# Patient Record
Sex: FEMALE | Race: WHITE | NOT HISPANIC OR LATINO | Employment: FULL TIME | URBAN - METROPOLITAN AREA
[De-identification: names, ages, dates, MRNs, and addresses within clinical notes are randomized per-mention and may not be internally consistent; named-entity substitution may affect disease eponyms.]

---

## 2017-01-26 DIAGNOSIS — Z13.0 SCREENING FOR DISORDER OF BLOOD AND BLOOD-FORMING ORGANS: ICD-10-CM

## 2017-01-26 LAB
ERYTHROCYTE [DISTWIDTH] IN BLOOD BY AUTOMATED COUNT: 12.9 % (ref 10–15)
HCT VFR BLD AUTO: 43.5 % (ref 35–47)
HGB BLD-MCNC: 14.6 G/DL (ref 11.7–15.7)
MCH RBC QN AUTO: 31.3 PG (ref 26.5–33)
MCHC RBC AUTO-ENTMCNC: 33.6 G/DL (ref 31.5–36.5)
MCV RBC AUTO: 93 FL (ref 78–100)
PLATELET # BLD AUTO: 233 10E9/L (ref 150–450)
RBC # BLD AUTO: 4.67 10E12/L (ref 3.8–5.2)
WBC # BLD AUTO: 8.2 10E9/L (ref 4–11)

## 2017-01-26 PROCEDURE — 36415 COLL VENOUS BLD VENIPUNCTURE: CPT | Performed by: OBSTETRICS & GYNECOLOGY

## 2017-01-26 PROCEDURE — 85027 COMPLETE CBC AUTOMATED: CPT | Performed by: OBSTETRICS & GYNECOLOGY

## 2017-02-02 ENCOUNTER — MYC MEDICAL ADVICE (OUTPATIENT)
Dept: OBGYN | Facility: CLINIC | Age: 62
End: 2017-02-02

## 2017-02-02 ENCOUNTER — MYC REFILL (OUTPATIENT)
Dept: OBGYN | Facility: CLINIC | Age: 62
End: 2017-02-02

## 2017-02-02 DIAGNOSIS — L50.9 URTICARIA: ICD-10-CM

## 2017-02-02 RX ORDER — PREDNISONE 5 MG/1
5 TABLET ORAL DAILY
Qty: 5 TABLET | Refills: 0 | Status: SHIPPED | OUTPATIENT
Start: 2017-02-02 | End: 2017-02-09 | Stop reason: ALTCHOICE

## 2017-02-02 RX ORDER — PREDNISONE 5 MG/1
5 TABLET ORAL DAILY
Qty: 5 TABLET | Refills: 0 | Status: CANCELLED | OUTPATIENT
Start: 2017-02-02

## 2017-02-02 NOTE — TELEPHONE ENCOUNTER
predniSONE (DELTASONE) 5 MG tablet  Last Written Prescription Date:  10/5/15  Last Fill Quantity: 5,   # refills: 0  Last Office Visit with [facility] primary care provider:  11/25/16  Future Office visit: none    Routing refill request to provider for review/approval because: OK to send refill    2/2/17 My Chart: I have had hives on my legs, back and arms for over a month.   Benedryl and Zyrtec don't seem to help so I've started taking the prednisone again.  I've changed my detergent, etc but still uncontrollable itching.    The only new thing introduced full time since my appointment is the hydrochlorothiazide.   I read on the side effects note it could cause hives and itching.     Is there a different blood pressure medicine I can try?   The itching is severe.   Dr. Gordon: Stop the HCTZ and see if improves. RTC in 2 weeks for BP check and decision about a different med. Can see CE.

## 2017-02-02 NOTE — TELEPHONE ENCOUNTER
Routing pt's [employer] message below to on-call Dr. Gordon (Dr. Akins is out of office) to review and advise.

## 2017-02-02 NOTE — TELEPHONE ENCOUNTER
Message from MyChart:  Original authorizing provider: Kassy Akins MD    Angelica PERLA Thuy would like a refill of the following medications:  predniSONE (DELTASONE) 5 MG tablet [Kassy Akins MD]    Preferred pharmacy: [facility] [address] [place] [address] [address]    Comment:

## 2017-02-02 NOTE — TELEPHONE ENCOUNTER
Stop the HCTZ and see if improves. RTC in 2 weeks for BP check and decision about a different med. Can see CE

## 2017-02-03 NOTE — TELEPHONE ENCOUNTER
Called pt back, she sated she received the messages and had no questions. Pt transferred to scheduling to make an appointment.

## 2017-02-09 ENCOUNTER — OFFICE VISIT (OUTPATIENT)
Dept: OBGYN | Facility: CLINIC | Age: 62
End: 2017-02-09
Payer: COMMERCIAL

## 2017-02-09 VITALS
BODY MASS INDEX: 24.08 KG/M2 | DIASTOLIC BLOOD PRESSURE: 80 MMHG | WEIGHT: 172 LBS | SYSTOLIC BLOOD PRESSURE: 122 MMHG | HEIGHT: 71 IN

## 2017-02-09 DIAGNOSIS — L27.0 ALLERGIC DRUG RASH: Primary | ICD-10-CM

## 2017-02-09 PROCEDURE — 99213 OFFICE O/P EST LOW 20 MIN: CPT | Performed by: PHYSICIAN ASSISTANT

## 2017-02-09 RX ORDER — METHYLPREDNISOLONE 4 MG
TABLET, DOSE PACK ORAL
Qty: 21 TABLET | Refills: 0 | Status: SHIPPED | OUTPATIENT
Start: 2017-02-09 | End: 2017-03-02

## 2017-02-09 RX ORDER — TRIAMCINOLONE ACETONIDE 1 MG/G
CREAM TOPICAL
Qty: 30 G | Refills: 0 | Status: SHIPPED | OUTPATIENT
Start: 2017-02-09 | End: 2022-12-16

## 2017-02-09 NOTE — MR AVS SNAPSHOT
After Visit Summary   2/9/2017    Angelica Daley    MRN: [id number]           Patient Information     Date Of Birth          1955        Visit Information        Provider Department      2/9/2017 8:30 AM Nathalie Horne PA-C [facility] [place]        Today's Diagnoses     Allergic drug rash    -  1        Follow-ups after your visit        Your next 10 appointments already scheduled     Feb 27, 2017  9:30 AM   Office Visit with Nathalie Horne PA-C   [facility] [place] ([facility])    [address] [place] [id number] [phone]           Bring a current list of meds and any records pertaining to this visit.  For Physicals, please bring immunization records and any forms needing to be filled out.  Please arrive 10 minutes early to complete paperwork.              Who to contact     If you have questions or need follow up information about today's clinic visit or your schedule please contact [facility] [place] directly at [phone].  Normal or non-critical lab and imaging results will be communicated to you by MolecuLighthart, letter or phone within 4 business days after the clinic has received the results. If you do not hear from us within 7 days, please contact the clinic through ADITU SASt or phone. If you have a critical or abnormal lab result, we will notify you by phone as soon as possible.  Submit refill requests through Pets are family too or call your pharmacy and they will forward the refill request to us. Please allow 3 business days for your refill to be completed.          Additional Information About Your Visit        MyChart Information     Pets are family too gives you secure access to your electronic health record. If you see a primary care provider, you can also send messages to your care team and make appointments. If you have questions, please call your primary care clinic.  If you do not have a primary  "care provider, please call [phone] and they will assist you.        Care EveryWhere ID     This is your Care EveryWhere ID. This could be used by other organizations to access your [place] medical records  [id number]        Your Vitals Were     Height BMI (Body Mass Index) Breastfeeding?             5' 11\" (1.803 m) 24.00 kg/m2 No          Blood Pressure from Last 3 Encounters:   02/09/17 122/80   11/25/16 128/90   01/04/16 116/80    Weight from Last 3 Encounters:   02/09/17 172 lb (78.019 kg)   11/25/16 170 lb (77.111 kg)   01/04/16 178 lb (80.74 kg)              Today, you had the following     No orders found for display         Today's Medication Changes          These changes are accurate as of: 2/9/17  9:02 AM.  If you have any questions, ask your nurse or doctor.               Start taking these medicines.        Dose/Directions    methylPREDNISolone 4 MG tablet   Commonly known as:  MEDROL DOSEPAK   Used for:  Allergic drug rash   Started by:  Nathalie Horne PA-C        Follow package instructions   Quantity:  21 tablet   Refills:  0       triamcinolone 0.1 % cream   Commonly known as:  KENALOG   Used for:  Allergic drug rash   Started by:  Nathalie Horne PA-C        Apply sparingly to affected area three times daily for 14 days.   Quantity:  30 g   Refills:  0         Stop taking these medicines if you haven't already. Please contact your care team if you have questions.     predniSONE 5 MG tablet   Commonly known as:  DELTASONE   Stopped by:  Nathalie Horne PA-C                Where to get your medicines      These medications were sent to [id number]  [place], [place] - [address] [facility] [address]  [address] [place], [place] [address] [address]    Hours:  24-hours Phone:  [phone]    - methylPREDNISolone 4 MG tablet  - triamcinolone 0.1 % cream             Primary Care Provider    None Specified       No primary provider on file.        Thank " you!     Thank you for choosing [facility]  for your care. Our goal is always to provide you with excellent care. Hearing back from our patients is one way we can continue to improve our services. Please take a few minutes to complete the written survey that you may receive in the mail after your visit with us. Thank you!             Your Updated Medication List - Protect others around you: Learn how to safely use, store and throw away your medicines at www.disposemymeds.org.          This list is accurate as of: 2/9/17  9:02 AM.  Always use your most recent med list.                   Brand Name Dispense Instructions for use    CALCIUM 600 1500 (600 CA) MG tablet   Generic drug:  calcium carbonate     60 tablet        cetirizine 10 MG tablet    zyrTEC    30 tablet    Take 1 tablet (10 mg) by mouth daily       cholecalciferol 1000 UNIT tablet    vitamin D    30 tablet    Take by mouth daily       hydrochlorothiazide 25 MG tablet    HYDRODIURIL    90 tablet    Take 1 tablet (25 mg) by mouth daily       methylPREDNISolone 4 MG tablet    MEDROL DOSEPAK    21 tablet    Follow package instructions       sertraline 100 MG tablet    ZOLOFT    90 tablet    Take 0.5 tablets (50 mg) by mouth daily       triamcinolone 0.1 % cream    KENALOG    30 g    Apply sparingly to affected area three times daily for 14 days.

## 2017-02-09 NOTE — PROGRESS NOTES
SUBJECTIVE:                                                   Angelica Daley is a 61 year old female who presents to clinic today for the following health issue(s):  Patient presents with:  Hives      HPI:  Restarted HCTZ 25mg about 6 weeks ago and about 3 weeks ago started getting a rash that has progressively gotten worse and very itchy. They are small hive like bumps on back, arms, legs, hands and torso. She has been using zyrtec during the day, calamine lotion, apple cider vinegar baths and nothing helping.     She called in and stopped the HCTZ one week ago, started prednisone 5mg daily and still no improvement of symptoms.     She recalls that when she was on HCTZ for a short while one year ago and developed a rash on her stomach shortly after starting it. Stopped due to dizziness and then just restarted again in Dec of 2016.     No breathing difficulty, throat swelling or swelling of lips, eyes. She otherwise feels well.   Blood pressure today is wnl.     No LMP recorded. Patient is postmenopausal..   Patient is sexually active, .  Using menopause for contraception.    reports that she has never smoked. She does not have any smokeless tobacco history on file.    STD testing offered?  Declined    Health maintenance updated:  yes    Today's PHQ-2 Score: No flowsheet data found.  Today's PHQ-9 Score:   PHQ-9 SCORE 2016   Total Score 0     Today's GUILLERMINA-7 Score:   GUILLERMINA-7 SCORE 2016   Total Score 1       Problem list and histories reviewed & adjusted, as indicated.  Additional history: as documented.    Patient Active Problem List   Diagnosis     Lumbago     Sprain of lumbar region     Advanced care planning/counseling discussion     Urticaria     Depression, major, recurrent, in complete remission (H)     Essential hypertension with goal blood pressure less than 140/90     Past Surgical History   Procedure Laterality Date     No history of surgery        Social History   Substance Use Topics  "    Smoking status: Never Smoker      Smokeless tobacco: Not on file     Alcohol Use: 0.0 oz/week     0 Standard drinks or equivalent per week      Comment: 1-2 3 times/sk      Problem (# of Occurrences) Relation (Name,Age of Onset)    Breast Cancer (1) Sister (49): RECURRED 11 YRS LATER AND  SUMMER 2016 AFTER 3 YRS FROM RECURRENCE    Hyperlipidemia (1) Sister    Hypertension (2) Sister, Maternal Grandmother            Current Outpatient Prescriptions   Medication Sig     methylPREDNISolone (MEDROL DOSEPAK) 4 MG tablet Follow package instructions     triamcinolone (KENALOG) 0.1 % cream Apply sparingly to affected area three times daily for 14 days.     sertraline (ZOLOFT) 100 MG tablet Take 0.5 tablets (50 mg) by mouth daily     Calcium Carbonate (CALCIUM 600) 1500 MG TABS      cholecalciferol (VITAMIN D) 1000 UNIT tablet Take by mouth daily     cetirizine (ZYRTEC) 10 MG tablet Take 1 tablet (10 mg) by mouth daily     No current facility-administered medications for this visit.     Allergies   Allergen Reactions     Hydrochlorothiazide Rash     Very itchy hives     Ibuprofen Swelling       ROS:  Skin: Rash    OBJECTIVE:     /80 mmHg  Ht 5' 11\" (1.803 m)  Wt 172 lb (78.019 kg)  BMI 24.00 kg/m2  Breastfeeding? No  Body mass index is 24 kg/(m^2).    Exam:  Constitutional:  Appearance: Well nourished, well developed alert, in no acute distress  Skin:General Inspection:  DIFFUSE PINK PAPULES OVER BACK, TORSO, LOWER STOMACH, ARMS, LOWER LEGS, DORSAL HANDS AND FEET. EXCORIATION MARKS PRESENT  Neurologic/Psychiatric:  Mental Status:  Oriented X3, affect appropriate      In-Clinic Test Results:  No results found for this or any previous visit (from the past 24 hour(s)).    ASSESSMENT/PLAN:                                                        ICD-10-CM    1. Allergic drug rash L27.0 methylPREDNISolone (MEDROL DOSEPAK) 4 MG tablet     triamcinolone (KENALOG) 0.1 % cream       Suspect drug reaction rash, HCTZ " placed on drug allergy list. Recommend she start medrol dose pka and use topical steroid cream 2-3 times daily for the next week prn. Reassurance given that drug rashes typically last 2 weeks after discontinuation of the medication.   RTC in 2 weeks for blood pressure follow up. Will likely recommend an ARB medication next.     Nathalie Horne PA-C  [facility]

## 2017-03-02 ENCOUNTER — OFFICE VISIT (OUTPATIENT)
Dept: OBGYN | Facility: CLINIC | Age: 62
End: 2017-03-02
Payer: COMMERCIAL

## 2017-03-02 VITALS — WEIGHT: 174 LBS | SYSTOLIC BLOOD PRESSURE: 144 MMHG | BODY MASS INDEX: 24.27 KG/M2 | DIASTOLIC BLOOD PRESSURE: 88 MMHG

## 2017-03-02 DIAGNOSIS — T78.40XA RASH DUE TO ALLERGY: ICD-10-CM

## 2017-03-02 DIAGNOSIS — R21 RASH DUE TO ALLERGY: ICD-10-CM

## 2017-03-02 DIAGNOSIS — I10 ESSENTIAL HYPERTENSION: Primary | ICD-10-CM

## 2017-03-02 PROCEDURE — 99213 OFFICE O/P EST LOW 20 MIN: CPT | Performed by: PHYSICIAN ASSISTANT

## 2017-03-02 RX ORDER — METHYLPREDNISOLONE 4 MG
TABLET, DOSE PACK ORAL
Qty: 21 TABLET | Refills: 0 | Status: SHIPPED | OUTPATIENT
Start: 2017-03-02 | End: 2022-12-16

## 2017-03-02 RX ORDER — VALSARTAN 40 MG/1
40 TABLET ORAL DAILY
Qty: 30 TABLET | Refills: 0 | Status: SHIPPED | OUTPATIENT
Start: 2017-03-02 | End: 2022-12-16

## 2017-03-02 NOTE — PROGRESS NOTES
SUBJECTIVE:                                                   Angelica Daley is a 61 year old female who presents to clinic today for the following health issue(s):  Patient presents with:  Hypertension: recheck after going off HCTZ    HPI:  Here for follow up of HTN and drug reaction rash. She was seen 2 weeks ago for rash which was very itchy and over entire body, suspect allergy to HCTZ. She took the medrol dose eric which helped a lot, but once she finished the itching started again. Currently still has some itching, although much better than 2 weeks ago and some visible papules. The ones remaining are on hands, back and legs.     She also uses topical TCM cream and calamine lotion, still takes benadryl nightly as well.     Blood pressure remains in the HTN range today at 144/88. She would like to wait until after her [place] trip to start another medication in case she has another drug reacion.     No LMP recorded. Patient is postmenopausal..   Patient is sexually active, .  Using menopause for contraception.    reports that she has never smoked. She has never used smokeless tobacco.    STD testing offered?  Declined    Health maintenance updated:  yes    Today's PHQ-2 Score: No flowsheet data found.  Today's PHQ-9 Score:   PHQ-9 SCORE 2016   Total Score 0     Today's GUILLERMINA-7 Score:   GUILLERMINA-7 SCORE 2016   Total Score 1       Problem list and histories reviewed & adjusted, as indicated.  Additional history: as documented.    Patient Active Problem List   Diagnosis     Lumbago     Sprain of lumbar region     Advanced care planning/counseling discussion     Urticaria     Depression, major, recurrent, in complete remission (H)     Essential hypertension with goal blood pressure less than 140/90     Past Surgical History   Procedure Laterality Date     No history of surgery        Social History   Substance Use Topics     Smoking status: Never Smoker     Smokeless tobacco: Never Used     Alcohol  use 0.0 oz/week     0 Standard drinks or equivalent per week      Comment: 1-2 3 times/sk      Problem (# of Occurrences) Relation (Name,Age of Onset)    Breast Cancer (1) Sister (49): RECURRED 11 YRS LATER AND  SUMMER 2016 AFTER 3 YRS FROM RECURRENCE    Hyperlipidemia (1) Sister    Hypertension (2) Sister, Maternal Grandmother            Current Outpatient Prescriptions   Medication Sig     valsartan (DIOVAN) 40 MG tablet Take 1 tablet (40 mg) by mouth daily     methylPREDNISolone (MEDROL DOSEPAK) 4 MG tablet Follow package instructions     triamcinolone (KENALOG) 0.1 % cream Apply sparingly to affected area three times daily for 14 days.     sertraline (ZOLOFT) 100 MG tablet Take 0.5 tablets (50 mg) by mouth daily     Calcium Carbonate (CALCIUM 600) 1500 MG TABS      cholecalciferol (VITAMIN D) 1000 UNIT tablet Take by mouth daily     cetirizine (ZYRTEC) 10 MG tablet Take 1 tablet (10 mg) by mouth daily     No current facility-administered medications for this visit.      Allergies   Allergen Reactions     Hydrochlorothiazide Rash     Very itchy hives     Ibuprofen Swelling       ROS:  12 point review of systems negative other than symptoms noted below.  Skin: Rash and Skin Dryness    OBJECTIVE:     /88  Wt 174 lb (78.9 kg)  Breastfeeding? No  BMI 24.27 kg/m2  Body mass index is 24.27 kg/(m^2).    Exam:  Constitutional:  Appearance: Well nourished, well developed alert, in no acute distress  Skin:General Inspection: HANDS, BACK AND LEGS WITH SCATTERED PINK PAPULES, EXCORIATION VALENTE.   Neurologic/Psychiatric:  Mental Status:  Oriented X3, affect appropriate      In-Clinic Test Results:  No results found for this or any previous visit (from the past 24 hour(s)).    ASSESSMENT/PLAN:                                                        ICD-10-CM    1. Essential hypertension I10 valsartan (DIOVAN) 40 MG tablet   2. Rash due to allergy R21 methylPREDNISolone (MEDROL DOSEPAK) 4 MG tablet       Ok for repeat  course of medrol dose eric and if she still has itching and visible rash at that point, will refer to derm.   Overall, symptoms are improving and reassurance given that I still feel her rash was due to HCTZ and that it can take a month to entirely resolve.     She will wait to start valsartan until after her trip to [place] (leaves 3/11 for one week). Then will RTC 2 weeks after start of medication for RN blood pressure check. If wnl and no side effects, ok to continue with current dose. Increase to 80mg daily if needed.     Nathalie Horne PA-C  [facility]

## 2017-03-02 NOTE — MR AVS SNAPSHOT
After Visit Summary   3/2/2017    Angelica Daley    MRN: [id number]           Patient Information     Date Of Birth          1955        Visit Information        Provider Department      3/2/2017 9:10 AM Nathalie Horne PA-C [facility] [place]        Today's Diagnoses     Essential hypertension    -  1    Rash due to allergy           Follow-ups after your visit        Follow-up notes from your care team     Return in about 4 weeks (around 3/30/2017) for BP Recheck.      Who to contact     If you have questions or need follow up information about today's clinic visit or your schedule please contact [facility] [place] directly at [phone].  Normal or non-critical lab and imaging results will be communicated to you by Cool Containershart, letter or phone within 4 business days after the clinic has received the results. If you do not hear from us within 7 days, please contact the clinic through Cool Containershart or phone. If you have a critical or abnormal lab result, we will notify you by phone as soon as possible.  Submit refill requests through [employer] or call your pharmacy and they will forward the refill request to us. Please allow 3 business days for your refill to be completed.          Additional Information About Your Visit        MyChart Information     [employer] gives you secure access to your electronic health record. If you see a primary care provider, you can also send messages to your care team and make appointments. If you have questions, please call your primary care clinic.  If you do not have a primary care provider, please call [phone] and they will assist you.        Care EveryWhere ID     This is your Care EveryWhere ID. This could be used by other organizations to access your [place] medical records  [id number]        Your Vitals Were     Breastfeeding? BMI (Body Mass Index)                No 24.27 kg/m2           Blood Pressure from Last 3 Encounters:    03/02/17 144/88   02/09/17 122/80   11/25/16 128/90    Weight from Last 3 Encounters:   03/02/17 174 lb (78.9 kg)   02/09/17 172 lb (78 kg)   11/25/16 170 lb (77.1 kg)              Today, you had the following     No orders found for display         Today's Medication Changes          These changes are accurate as of: 3/2/17  9:44 AM.  If you have any questions, ask your nurse or doctor.               Start taking these medicines.        Dose/Directions    methylPREDNISolone 4 MG tablet   Commonly known as:  MEDROL DOSEPAK   Used for:  Rash due to allergy   Started by:  Nathalie Horne PA-C        Follow package instructions   Quantity:  21 tablet   Refills:  0       valsartan 40 MG tablet   Commonly known as:  DIOVAN   Used for:  Essential hypertension   Started by:  Nathalie Horne PA-C        Dose:  40 mg   Take 1 tablet (40 mg) by mouth daily   Quantity:  30 tablet   Refills:  0            Where to get your medicines      [facility] [address] [place] [address] [address] [id number]    Hours:  24-hours Phone:  [phone]     methylPREDNISolone 4 MG tablet    valsartan 40 MG tablet                Primary Care Provider    None Specified       No primary provider on file.        Thank you!     Thank you for choosing [facility]  for your care. Our goal is always to provide you with excellent care. Hearing back from our patients is one way we can continue to improve our services. Please take a few minutes to complete the written survey that you may receive in the mail after your visit with us. Thank you!             Your Updated Medication List - Protect others around you: Learn how to safely use, store and throw away your medicines at www.disposemymeds.org.          This list is accurate as of: 3/2/17  9:44 AM.  Always use your most recent med list.                   Brand Name  Dispense Instructions for use    CALCIUM 600 1500 (600 CA) MG tablet   Generic drug:  calcium carbonate     60 tablet        cetirizine 10 MG tablet    zyrTEC    30 tablet    Take 1 tablet (10 mg) by mouth daily       cholecalciferol 1000 UNIT tablet    vitamin D    30 tablet    Take by mouth daily       methylPREDNISolone 4 MG tablet    MEDROL DOSEPAK    21 tablet    Follow package instructions       sertraline 100 MG tablet    ZOLOFT    90 tablet    Take 0.5 tablets (50 mg) by mouth daily       triamcinolone 0.1 % cream    KENALOG    30 g    Apply sparingly to affected area three times daily for 14 days.       valsartan 40 MG tablet    DIOVAN    30 tablet    Take 1 tablet (40 mg) by mouth daily

## 2017-04-19 ENCOUNTER — ALLIED HEALTH/NURSE VISIT (OUTPATIENT)
Dept: NURSING | Facility: CLINIC | Age: 62
End: 2017-04-19
Payer: COMMERCIAL

## 2017-04-19 DIAGNOSIS — I10 HYPERTENSION, ESSENTIAL: Primary | ICD-10-CM

## 2017-04-19 PROCEDURE — 99207 ZZC NO CHARGE NURSE ONLY: CPT

## 2017-04-19 RX ORDER — VALSARTAN 40 MG/1
40 TABLET ORAL DAILY
Qty: 30 TABLET | Refills: 0 | Status: SHIPPED | OUTPATIENT
Start: 2017-04-19 | End: 2017-05-16

## 2017-04-19 NOTE — MR AVS SNAPSHOT
After Visit Summary   4/19/2017    Angelica Daley    MRN: [id number]           Patient Information     Date Of Birth          1955        Visit Information        Provider Department      4/19/2017 10:10 AM WE TRIAGE [facility] Santo        Today's Diagnoses     Hypertension, essential    -  1       Follow-ups after your visit        Who to contact     If you have questions or need follow up information about today's clinic visit or your schedule please contact [facility] [place] SANTO directly at [phone].  Normal or non-critical lab and imaging results will be communicated to you by MyChart, letter or phone within 4 business days after the clinic has received the results. If you do not hear from us within 7 days, please contact the clinic through TrendBentt or phone. If you have a critical or abnormal lab result, we will notify you by phone as soon as possible.  Submit refill requests through iSOCO or call your pharmacy and they will forward the refill request to us. Please allow 3 business days for your refill to be completed.          Additional Information About Your Visit        MyChart Information     iSOCO gives you secure access to your electronic health record. If you see a primary care provider, you can also send messages to your care team and make appointments. If you have questions, please call your primary care clinic.  If you do not have a primary care provider, please call [phone] and they will assist you.        Care EveryWhere ID     This is your Care EveryWhere ID. This could be used by other organizations to access your [facility] medical records  [id number]         Blood Pressure from Last 3 Encounters:   03/02/17 144/88   02/09/17 122/80   11/25/16 128/90    Weight from Last 3 Encounters:   03/02/17 174 lb (78.9 kg)   02/09/17 172 lb (78 kg)   11/25/16 170 lb (77.1 kg)              Today, you had the following     No orders found for display          Today's Medication Changes          These changes are accurate as of: 4/19/17 11:59 PM.  If you have any questions, ask your nurse or doctor.               Start taking these medicines.        Dose/Directions    order for DME   Used for:  Hypertension, essential        Equipment being ordered: blood pressure cuff   Quantity:  1 Units   Refills:  0         These medicines have changed or have updated prescriptions.        Dose/Directions    * valsartan 40 MG tablet   Commonly known as:  DIOVAN   This may have changed:  Another medication with the same name was added. Make sure you understand how and when to take each.   Used for:  Essential hypertension        Dose:  40 mg   Take 1 tablet (40 mg) by mouth daily   Quantity:  30 tablet   Refills:  0       * valsartan 40 MG tablet   Commonly known as:  DIOVAN   This may have changed:  You were already taking a medication with the same name, and this prescription was added. Make sure you understand how and when to take each.   Used for:  Hypertension, essential        Dose:  40 mg   Take 1 tablet (40 mg) by mouth daily   Quantity:  30 tablet   Refills:  0       * Notice:  This list has 2 medication(s) that are the same as other medications prescribed for you. Read the directions carefully, and ask your doctor or other care provider to review them with you.         Where to get your medicines      These medications were sent to [facility] [id number] [place] [address] [address]  [address] [place] [id number]    Hours:  24-hours Phone:  [phone]     valsartan 40 MG tablet         Some of these will need a paper prescription and others can be bought over the counter.  Ask your nurse if you have questions.     Bring a paper prescription for each of these medications     order for DME                Primary Care Provider    None Specified       No primary provider on file.        Thank you!     Thank you for choosing  [facility]  for your care. Our goal is always to provide you with excellent care. Hearing back from our patients is one way we can continue to improve our services. Please take a few minutes to complete the written survey that you may receive in the mail after your visit with us. Thank you!             Your Updated Medication List - Protect others around you: Learn how to safely use, store and throw away your medicines at www.disposemymeds.org.          This list is accurate as of: 4/19/17 11:59 PM.  Always use your most recent med list.                   Brand Name Dispense Instructions for use    CALCIUM 600 1500 (600 CA) MG tablet   Generic drug:  calcium carbonate     60 tablet        cetirizine 10 MG tablet    zyrTEC    30 tablet    Take 1 tablet (10 mg) by mouth daily       cholecalciferol 1000 UNIT tablet    vitamin D    30 tablet    Take by mouth daily       methylPREDNISolone 4 MG tablet    MEDROL DOSEPAK    21 tablet    Follow package instructions       order for DME     1 Units    Equipment being ordered: blood pressure cuff       sertraline 100 MG tablet    ZOLOFT    90 tablet    Take 0.5 tablets (50 mg) by mouth daily       triamcinolone 0.1 % cream    KENALOG    30 g    Apply sparingly to affected area three times daily for 14 days.       * valsartan 40 MG tablet    DIOVAN    30 tablet    Take 1 tablet (40 mg) by mouth daily       * valsartan 40 MG tablet    DIOVAN    30 tablet    Take 1 tablet (40 mg) by mouth daily       * Notice:  This list has 2 medication(s) that are the same as other medications prescribed for you. Read the directions carefully, and ask your doctor or other care provider to review them with you.

## 2017-04-19 NOTE — NURSING NOTE
Pt here for BP check. Started Valsartan 40 mg 3/2/17. Pt had allergic reaction to HCTZ. Pt states she is feeling well, no HA, dizziness, lightheadedness, SOB. BP right arm 138/88, BP left amr 128/88. Reviewed with Dr. Akins. Pt is to get home BP machine and take BP at different times of day for two weeks. Pt will call at two weeks with BP readings. Pt informed. Instructed to bring in BP machine so that it can be calibrated. Pt will come on Friday. Scheduling notified. Pt discharged to home.

## 2017-04-21 ENCOUNTER — ALLIED HEALTH/NURSE VISIT (OUTPATIENT)
Dept: NURSING | Facility: CLINIC | Age: 62
End: 2017-04-21
Payer: COMMERCIAL

## 2017-04-21 DIAGNOSIS — I10 HTN (HYPERTENSION): Primary | ICD-10-CM

## 2017-04-21 PROCEDURE — 99207 ZZC NO CHARGE NURSE ONLY: CPT

## 2017-04-21 NOTE — MR AVS SNAPSHOT
After Visit Summary   4/21/2017    Angelica Daley    MRN: [id number]           Patient Information     Date Of Birth          1955        Today's Diagnoses     HTN (hypertension)    -  1       Follow-ups after your visit        Who to contact     If you have questions or need follow up information about today's clinic visit or your schedule please contact [facility] [place] directly at [phone].  Normal or non-critical lab and imaging results will be communicated to you by Turbine Truck Engineshart, letter or phone within 4 business days after the clinic has received the results. If you do not hear from us within 7 days, please contact the clinic through Baboot or phone. If you have a critical or abnormal lab result, we will notify you by phone as soon as possible.  Submit refill requests through ReFashioner or call your pharmacy and they will forward the refill request to us. Please allow 3 business days for your refill to be completed.          Additional Information About Your Visit        MyChart Information     ReFashioner gives you secure access to your electronic health record. If you see a primary care provider, you can also send messages to your care team and make appointments. If you have questions, please call your primary care clinic.  If you do not have a primary care provider, please call [phone] and they will assist you.        Care EveryWhere ID     This is your Care EveryWhere ID. This could be used by other organizations to access your [facility] medical records  [id number]         Blood Pressure from Last 3 Encounters:   03/02/17 144/88   02/09/17 122/80   11/25/16 128/90    Weight from Last 3 Encounters:   03/02/17 174 lb (78.9 kg)   02/09/17 172 lb (78 kg)   11/25/16 170 lb (77.1 kg)              Today, you had the following     No orders found for display       Primary Care Provider    None Specified       No primary provider on file.        Thank you!     Thank you for  choosing [facility]  for your care. Our goal is always to provide you with excellent care. Hearing back from our patients is one way we can continue to improve our services. Please take a few minutes to complete the written survey that you may receive in the mail after your visit with us. Thank you!             Your Updated Medication List - Protect others around you: Learn how to safely use, store and throw away your medicines at www.disposemymeds.org.          This list is accurate as of: 4/21/17 10:14 AM.  Always use your most recent med list.                   Brand Name Dispense Instructions for use    CALCIUM 600 1500 (600 CA) MG tablet   Generic drug:  calcium carbonate     60 tablet        cetirizine 10 MG tablet    zyrTEC    30 tablet    Take 1 tablet (10 mg) by mouth daily       cholecalciferol 1000 UNIT tablet    vitamin D    30 tablet    Take by mouth daily       methylPREDNISolone 4 MG tablet    MEDROL DOSEPAK    21 tablet    Follow package instructions       order for DME     1 Units    Equipment being ordered: blood pressure cuff       sertraline 100 MG tablet    ZOLOFT    90 tablet    Take 0.5 tablets (50 mg) by mouth daily       triamcinolone 0.1 % cream    KENALOG    30 g    Apply sparingly to affected area three times daily for 14 days.       * valsartan 40 MG tablet    DIOVAN    30 tablet    Take 1 tablet (40 mg) by mouth daily       * valsartan 40 MG tablet    DIOVAN    30 tablet    Take 1 tablet (40 mg) by mouth daily       * Notice:  This list has 2 medication(s) that are the same as other medications prescribed for you. Read the directions carefully, and ask your doctor or other care provider to review them with you.

## 2017-04-21 NOTE — NURSING NOTE
Pt came in to be seen for blood pressure machine calibration. Pt had brought in a blood pressure cuff not a machine. Informed pt she should use her Rx from Dr. Akins to  an electronic BP machine so she can get accurate reading as it is nearly impossible for someone to get an accurate reading doing their own BP with a pump up BP cuff. Instructed pt then to use the electronic BP machine to monitor her BPs over the next 2 weeks and to call with results. Pt stated understanding and had no further questions.

## 2017-05-16 ENCOUNTER — ALLIED HEALTH/NURSE VISIT (OUTPATIENT)
Dept: NURSING | Facility: CLINIC | Age: 62
End: 2017-05-16
Payer: COMMERCIAL

## 2017-05-16 VITALS — DIASTOLIC BLOOD PRESSURE: 111 MMHG | HEART RATE: 64 BPM | SYSTOLIC BLOOD PRESSURE: 163 MMHG

## 2017-05-16 DIAGNOSIS — I10 HYPERTENSION, ESSENTIAL: ICD-10-CM

## 2017-05-16 PROCEDURE — 99207 ZZC NO CHARGE NURSE ONLY: CPT

## 2017-05-16 RX ORDER — VALSARTAN 40 MG/1
40 TABLET ORAL DAILY
Qty: 90 TABLET | Refills: 0 | Status: SHIPPED | OUTPATIENT
Start: 2017-05-16 | End: 2017-06-06

## 2017-05-16 NOTE — NURSING NOTE
Pt here to calibrate her home BP machine. Pt had a wrist BP machine and stated that her BPs have been running high at home, pt denies headache, vision changes, upper gastric pain, SOB, chest pain.   Pt BPs in clinic today with her home BP: 158/103, 148/95, 163/111  Pt BPs in clinic today with our machine: 136/81 and manually: 130/84  Spoke with Dr. Gordon who stated pt ok for refill and to get a more accurate BP cuff for home use.   Pt instructed to take BPs with new cuff twice a day for a week and call in to let clinic know readings. Pt stated she would. Rx sent to pt's preferred pharmacy. Pt discharged to home in stable condition.

## 2017-05-16 NOTE — MR AVS SNAPSHOT
After Visit Summary   5/16/2017    Angelica Daley    MRN: [id number]           Patient Information     Date Of Birth          1955        Visit Information        Provider Department      5/16/2017 11:30 AM WE TRIAGE [facility] Santo        Today's Diagnoses     Hypertension, essential           Follow-ups after your visit        Who to contact     If you have questions or need follow up information about today's clinic visit or your schedule please contact [facility]IA SANTO directly at [phone].  Normal or non-critical lab and imaging results will be communicated to you by SocStockhart, letter or phone within 4 business days after the clinic has received the results. If you do not hear from us within 7 days, please contact the clinic through [email or web address] or phone. If you have a critical or abnormal lab result, we will notify you by phone as soon as possible.  Submit refill requests through BudgetSimple or call your pharmacy and they will forward the refill request to us. Please allow 3 business days for your refill to be completed.          Additional Information About Your Visit        MyChart Information     BudgetSimple gives you secure access to your electronic health record. If you see a primary care provider, you can also send messages to your care team and make appointments. If you have questions, please call your primary care clinic.  If you do not have a primary care provider, please call [phone] and they will assist you.        Care EveryWhere ID     This is your Care EveryWhere ID. This could be used by other organizations to access your [place] medical records  [id number]        Your Vitals Were     Pulse                   64            Blood Pressure from Last 3 Encounters:   05/16/17 (!) 163/111   03/02/17 144/88   02/09/17 122/80    Weight from Last 3 Encounters:   03/02/17 174 lb (78.9 kg)   02/09/17 172 lb (78 kg)   11/25/16 170 lb (77.1 kg)               Today, you had the following     No orders found for display         Where to get your medicines      These medications were sent to [id number] - [place], [place] - [address] AT [address]  [address] [place] [address] [address]    Hours:  24-hours Phone:  [phone]     valsartan 40 MG tablet          Primary Care Provider    None Specified       No primary provider on file.        Thank you!     Thank you for choosing [facility] [place]  for your care. Our goal is always to provide you with excellent care. Hearing back from our patients is one way we can continue to improve our services. Please take a few minutes to complete the written survey that you may receive in the mail after your visit with us. Thank you!             Your Updated Medication List - Protect others around you: Learn how to safely use, store and throw away your medicines at www.disposemymeds.org.          This list is accurate as of: 5/16/17 11:54 AM.  Always use your most recent med list.                   Brand Name Dispense Instructions for use    CALCIUM 600 1500 (600 CA) MG tablet   Generic drug:  calcium carbonate     60 tablet        cetirizine 10 MG tablet    zyrTEC    30 tablet    Take 1 tablet (10 mg) by mouth daily       cholecalciferol 1000 UNIT tablet    vitamin D    30 tablet    Take by mouth daily       methylPREDNISolone 4 MG tablet    MEDROL DOSEPAK    21 tablet    Follow package instructions       order for DME     1 Units    Equipment being ordered: blood pressure cuff       sertraline 100 MG tablet    ZOLOFT    90 tablet    Take 0.5 tablets (50 mg) by mouth daily       triamcinolone 0.1 % cream    KENALOG    30 g    Apply sparingly to affected area three times daily for 14 days.       * valsartan 40 MG tablet    DIOVAN    30 tablet    Take 1 tablet (40 mg) by mouth daily       * valsartan 40 MG tablet    DIOVAN    90 tablet    Take 1 tablet (40 mg) by mouth daily       * Notice:   This list has 2 medication(s) that are the same as other medications prescribed for you. Read the directions carefully, and ask your doctor or other care provider to review them with you.

## 2017-06-05 ENCOUNTER — MYC MEDICAL ADVICE (OUTPATIENT)
Dept: OBGYN | Facility: CLINIC | Age: 62
End: 2017-06-05

## 2017-06-05 DIAGNOSIS — I10 HYPERTENSION, ESSENTIAL: ICD-10-CM

## 2017-06-05 NOTE — TELEPHONE ENCOUNTER
Routing pt's [employer] message below to Dr. Akins to review and advise. Pt takes Valsartan (Diovan) 40 MG tablet daily.

## 2017-06-06 RX ORDER — VALSARTAN 40 MG/1
40 TABLET ORAL DAILY
Qty: 90 TABLET | Refills: 0 | Status: SHIPPED | OUTPATIENT
Start: 2017-06-06 | End: 2017-11-25

## 2017-06-06 NOTE — TELEPHONE ENCOUNTER
That sounds good. We can send refills on her diovan until she is due for visit if we are the ones rx'ing it

## 2017-06-06 NOTE — TELEPHONE ENCOUNTER
Last annual at our clinic was 11/25/16 with Dr. Akins. Our clinic has been prescribing this Rx for her. 5/16/17 Rx was sent for 90 tabs/0rf. Rx sent for another 3 month Rx which will get her to her next annual.  Pt informed via OleOle.      Closing encounter.

## 2017-06-12 ENCOUNTER — TRANSFERRED RECORDS (OUTPATIENT)
Dept: HEALTH INFORMATION MANAGEMENT | Facility: CLINIC | Age: 62
End: 2017-06-12

## 2017-09-05 DIAGNOSIS — I10 HYPERTENSION, ESSENTIAL: ICD-10-CM

## 2017-09-05 RX ORDER — VALSARTAN 40 MG/1
TABLET ORAL
Qty: 90 TABLET | Refills: 0 | OUTPATIENT
Start: 2017-09-05

## 2017-09-05 NOTE — TELEPHONE ENCOUNTER
Valsartan 40mg      Last Written Prescription Date:  6/6/17  Last Fill Quantity: 90,   # refills: 0  Last Office Visit with [facility] primary care provider:  11/25/16  Future Office visit: none    Pt was sent an additional 3 month supply 6/6/17 for 3 month supply to get her until her annual due time, Called pharmacy and they stated they still had the 3 month supply from June and cancelled the refill request. Closing encounter.

## 2017-11-21 DIAGNOSIS — F33.42 RECURRENT MAJOR DEPRESSION IN COMPLETE REMISSION (H): ICD-10-CM

## 2017-11-21 DIAGNOSIS — I10 HYPERTENSION, ESSENTIAL: ICD-10-CM

## 2017-11-21 NOTE — TELEPHONE ENCOUNTER
Mail order transfer request    Pending Prescriptions:                       Disp   Refills    valsartan (DIOVAN) 40 MG tablet           90 tab*0            Sig: Take 1 tablet (40 mg) by mouth daily  Last Written Prescription Date:  6/6/17  Last Fill Quantity: 90,   # refills: 0  Future Office visit:   none      sertraline (ZOLOFT) 100 MG tablet         90 tab*3            Sig: Take 0.5 tablets (50 mg) by mouth daily  Last Written Prescription Date:  11/25/16  Last Fill Quantity: 90,   # refills: 3  Future Office visit:   none

## 2017-11-25 ENCOUNTER — MYC REFILL (OUTPATIENT)
Dept: OBGYN | Facility: CLINIC | Age: 62
End: 2017-11-25

## 2017-11-25 DIAGNOSIS — F33.42 RECURRENT MAJOR DEPRESSION IN COMPLETE REMISSION (H): ICD-10-CM

## 2017-11-25 DIAGNOSIS — I10 HYPERTENSION, ESSENTIAL: ICD-10-CM

## 2017-11-27 RX ORDER — VALSARTAN 40 MG/1
40 TABLET ORAL DAILY
Qty: 30 TABLET | Refills: 0 | Status: SHIPPED | OUTPATIENT
Start: 2017-11-27 | End: 2022-12-16

## 2017-11-27 RX ORDER — SERTRALINE HYDROCHLORIDE 100 MG/1
50 TABLET, FILM COATED ORAL DAILY
Qty: 30 TABLET | Refills: 0 | Status: SHIPPED | OUTPATIENT
Start: 2017-11-27 | End: 2022-12-16

## 2017-11-27 NOTE — TELEPHONE ENCOUNTER
Message from BarkBoxt:  Original authorizing provider: Kassy Akins MD    Angelica PERLAPattie Daley would like a refill of the following medications:  sertraline (ZOLOFT) 100 MG tablet [Kassy Akins MD]  valsartan (DIOVAN) 40 MG tablet [Kassy Akins MD]    Preferred pharmacy: [facility] [address] [place] [address] [address] [place] [address]    Comment:  I received a voice mail last week regarding a possible change in my pharmacy. I am planning to stay with [facility] in [place].

## 2017-11-27 NOTE — TELEPHONE ENCOUNTER
sertraline (ZOLOFT) 100 MG tablet    Last Written Prescription Date:  11/25/16  Last Fill Quantity: 90,   # refills: 3  [facility] Office Visit with [facility] primary care provider:  11/25/16  Future Office visit: none    Routing refill request to provider for review/approval because:  One month extension sent per [place] [facility] Protocol     valsartan (DIOVAN) 40 MG tablet  Last Written Prescription Date:  6/6/17  Last Fill Quantity: 90,   # refills: 0  Last Office Visit with [facility] primary care provider:  11/25/16  Future Office visit: none    Routing refill request to provider for review/approval because:  One month extension sent per [place] [facility] protocol

## 2018-01-29 RX ORDER — VALSARTAN 40 MG/1
40 TABLET ORAL DAILY
Qty: 90 TABLET | Refills: 0 | OUTPATIENT
Start: 2018-01-29

## 2018-01-29 RX ORDER — SERTRALINE HYDROCHLORIDE 100 MG/1
50 TABLET, FILM COATED ORAL DAILY
Qty: 90 TABLET | Refills: 3 | OUTPATIENT
Start: 2018-01-29

## 2018-03-17 ENCOUNTER — HEALTH MAINTENANCE LETTER (OUTPATIENT)
Age: 63
End: 2018-03-17

## 2018-10-10 ENCOUNTER — TRANSFERRED RECORDS (OUTPATIENT)
Dept: HEALTH INFORMATION MANAGEMENT | Facility: CLINIC | Age: 63
End: 2018-10-10

## 2019-10-01 ENCOUNTER — HEALTH MAINTENANCE LETTER (OUTPATIENT)
Age: 64
End: 2019-10-01

## 2019-10-25 ENCOUNTER — TRANSFERRED RECORDS (OUTPATIENT)
Dept: HEALTH INFORMATION MANAGEMENT | Facility: CLINIC | Age: 64
End: 2019-10-25

## 2020-10-27 ENCOUNTER — TRANSFERRED RECORDS (OUTPATIENT)
Dept: HEALTH INFORMATION MANAGEMENT | Facility: CLINIC | Age: 65
End: 2020-10-27

## 2021-01-15 ENCOUNTER — HEALTH MAINTENANCE LETTER (OUTPATIENT)
Age: 66
End: 2021-01-15

## 2021-09-04 ENCOUNTER — HEALTH MAINTENANCE LETTER (OUTPATIENT)
Age: 66
End: 2021-09-04

## 2021-11-01 ENCOUNTER — TRANSFERRED RECORDS (OUTPATIENT)
Dept: HEALTH INFORMATION MANAGEMENT | Facility: CLINIC | Age: 66
End: 2021-11-01
Payer: COMMERCIAL

## 2022-02-19 ENCOUNTER — HEALTH MAINTENANCE LETTER (OUTPATIENT)
Age: 67
End: 2022-02-19

## 2022-10-16 ENCOUNTER — HEALTH MAINTENANCE LETTER (OUTPATIENT)
Age: 67
End: 2022-10-16

## 2022-12-09 ENCOUNTER — TRANSFERRED RECORDS (OUTPATIENT)
Dept: HEALTH INFORMATION MANAGEMENT | Facility: CLINIC | Age: 67
End: 2022-12-09

## 2022-12-15 ENCOUNTER — TRANSFERRED RECORDS (OUTPATIENT)
Dept: HEALTH INFORMATION MANAGEMENT | Facility: CLINIC | Age: 67
End: 2022-12-15

## 2022-12-15 ENCOUNTER — TELEPHONE (OUTPATIENT)
Dept: OBGYN | Facility: CLINIC | Age: 67
End: 2022-12-15

## 2022-12-15 NOTE — TELEPHONE ENCOUNTER
Kaylee from [facility] imaging calling for orders.  Pt had routine mammogram on 12/9/22 - found mass of right breast with needs further imaging and possible biopsy.    Pt states her PCP is Dr Akins but has not been seen by Dr Akins since 11/25/2016  Has not been seen at [facility] since 3/2/2017 by PA who is no longer at clinic.    Unable to provide orders as she has not been seen here by DR Akins since 2016  [facility] rep will contact pt.    Clary Hightower RN on 12/15/2022 at 12:42 PM

## 2022-12-15 NOTE — TELEPHONE ENCOUNTER
Dr. Akins has been consulted and she agreed, she has not seen patient since 2016 and needs to have a PCP or be seen to update patient status with her.    Pt has been informed directly and has made an apt w Dr Abarca tomorrow.    Clary Hightower RN on 12/15/2022 at 3:30 PM

## 2022-12-16 ENCOUNTER — VIRTUAL VISIT (OUTPATIENT)
Dept: FAMILY MEDICINE | Facility: CLINIC | Age: 67
End: 2022-12-16
Payer: COMMERCIAL

## 2022-12-16 DIAGNOSIS — R92.8 ABNORMAL MAMMOGRAM: Primary | ICD-10-CM

## 2022-12-16 PROCEDURE — 99203 OFFICE O/P NEW LOW 30 MIN: CPT | Mod: 95 | Performed by: INTERNAL MEDICINE

## 2022-12-16 NOTE — PROGRESS NOTES
Angelica is a 66 year old who is being evaluated via a billable video visit.      How would you like to obtain your AVS? Ty  If the video visit is dropped, the invitation should be resent by: Other e-mail: ty  Will anyone else be joining your video visit? No    Assessment & Plan     Abnormal mammogram  Patient had imaging done at [facility] 12/9/22 for screening mammogram for which she states suspicious finding that required diag mammo/US on 12/14/22. States [facility] recommends she follow-up with her PCP to arrange follow-up/biopsy. She did not have a regular PCP and is establishing care today for assistance with referral/next steps. She denies hx of abnormal mammograms and has been keeping up with them yearly. She does have family hx of breast cancer in sister. We do not currently have imaging on file from [facility], will request. I also walked over to the [facility] imaging center personally and signed paperwork for her to be able to proceed with biopsy as already planned Monday. She is greatly appreciative. [facility] states if abnormal findings, they will assist with arranging [place] breast specialists in patient's care team.    Return in about 1 year (around 12/16/2023) for Routine preventive, with me, in person, sooner if symptoms worsen or do not improve.    Janet Abarca DO  [facility] [place]    Lon Dominguez is a 66 year old, presenting for the following health issues:  Establish Care      Former PCP: none  Specialists: ob/gyn  Problem list and medications reviewed and updated. See below for additional notes.    Angelica presents virtually via video to establish care. She is otherwise healthy, no prescription medications. Monitors BP at home periodically and is controlled.     Main concern today is assistance with arranging biopsy of breast. She has routinely been doing imaging through [facility] for yearly mammograms, compliant. No hx of abnormal imaging in the past. States this time R breast returned suspicous, has  follow-up diag mammo/US. States [facility] recommended follow-up with PCP to arrange biopsy. She states she was not seeing regular PCP, primarily ob/gyn, so is establishing today. She does have family hx breast cancer in sister. Does not think it was genetic.     Review of Systems   Constitutional, HEENT, cardiovascular, pulmonary, gi and gu systems are negative, except as otherwise noted.      Objective           Vitals:  No vitals were obtained today due to virtual visit.    Physical Exam   GEN: No acute distress  RESP: No audible increased work of breathing. Patient speaking in full sentences without distress.  PSYCH: pleasant  Exam otherwise limited due to virtual platform          Video-Visit Details    Video Start Time: 8:30 AM    Type of service:  Video Visit    Video End Time:8:39 AM    Originating Location (pt. Location): Home    Distant Location (provider location):  On-site    Platform used for Video Visit: Breezeplay

## 2022-12-19 ENCOUNTER — TRANSFERRED RECORDS (OUTPATIENT)
Dept: HEALTH INFORMATION MANAGEMENT | Facility: CLINIC | Age: 67
End: 2022-12-19

## 2023-04-01 ENCOUNTER — HEALTH MAINTENANCE LETTER (OUTPATIENT)
Age: 68
End: 2023-04-01

## 2023-10-13 ENCOUNTER — TRANSFERRED RECORDS (OUTPATIENT)
Dept: HEALTH INFORMATION MANAGEMENT | Facility: CLINIC | Age: 68
End: 2023-10-13
Payer: COMMERCIAL

## 2023-10-25 ENCOUNTER — TRANSFERRED RECORDS (OUTPATIENT)
Dept: HEALTH INFORMATION MANAGEMENT | Facility: CLINIC | Age: 68
End: 2023-10-25

## 2024-01-17 ENCOUNTER — TRANSFERRED RECORDS (OUTPATIENT)
Dept: HEALTH INFORMATION MANAGEMENT | Facility: CLINIC | Age: 69
End: 2024-01-17
Payer: COMMERCIAL

## 2024-06-01 ENCOUNTER — HEALTH MAINTENANCE LETTER (OUTPATIENT)
Age: 69
End: 2024-06-01

## 2025-04-02 ENCOUNTER — TRANSFERRED RECORDS (OUTPATIENT)
Dept: HEALTH INFORMATION MANAGEMENT | Facility: CLINIC | Age: 70
End: 2025-04-02
Payer: COMMERCIAL

## 2025-06-14 ENCOUNTER — HEALTH MAINTENANCE LETTER (OUTPATIENT)
Age: 70
End: 2025-06-14